# Patient Record
Sex: FEMALE | URBAN - METROPOLITAN AREA
[De-identification: names, ages, dates, MRNs, and addresses within clinical notes are randomized per-mention and may not be internally consistent; named-entity substitution may affect disease eponyms.]

---
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
## 2024-08-21 RX ORDER — CYCLOSPORINE 0 G/ML
1 SOLUTION/ DROPS OPHTHALMIC; TOPICAL 2 TIMES DAILY
COMMUNITY

## 2024-09-05 ENCOUNTER — HOSPITAL ENCOUNTER (OUTPATIENT)
Facility: HOSPITAL | Age: 24
Setting detail: HOSPITAL OUTPATIENT SURGERY
Discharge: HOME | End: 2024-09-05
Attending: INTERNAL MEDICINE | Admitting: INTERNAL MEDICINE
Payer: COMMERCIAL

## 2024-09-05 ENCOUNTER — ANESTHESIA EVENT (OUTPATIENT)
Dept: ENDOSCOPY | Facility: HOSPITAL | Age: 24
End: 2024-09-05
Payer: COMMERCIAL

## 2024-09-05 ENCOUNTER — ANESTHESIA (OUTPATIENT)
Dept: ENDOSCOPY | Facility: HOSPITAL | Age: 24
End: 2024-09-05
Payer: COMMERCIAL

## 2024-09-05 VITALS
WEIGHT: 187 LBS | OXYGEN SATURATION: 94 % | BODY MASS INDEX: 35.3 KG/M2 | RESPIRATION RATE: 16 BRPM | TEMPERATURE: 98 F | HEART RATE: 68 BPM | HEIGHT: 61 IN | DIASTOLIC BLOOD PRESSURE: 51 MMHG | SYSTOLIC BLOOD PRESSURE: 87 MMHG

## 2024-09-05 PROCEDURE — 88305 TISSUE EXAM BY PATHOLOGIST: CPT | Performed by: INTERNAL MEDICINE

## 2024-09-05 PROCEDURE — 0DB68ZX EXCISION OF STOMACH, VIA NATURAL OR ARTIFICIAL OPENING ENDOSCOPIC, DIAGNOSTIC: ICD-10-PCS | Performed by: INTERNAL MEDICINE

## 2024-09-05 PROCEDURE — 0DBE8ZX EXCISION OF LARGE INTESTINE, VIA NATURAL OR ARTIFICIAL OPENING ENDOSCOPIC, DIAGNOSTIC: ICD-10-PCS | Performed by: INTERNAL MEDICINE

## 2024-09-05 PROCEDURE — 0DB98ZX EXCISION OF DUODENUM, VIA NATURAL OR ARTIFICIAL OPENING ENDOSCOPIC, DIAGNOSTIC: ICD-10-PCS | Performed by: INTERNAL MEDICINE

## 2024-09-05 RX ORDER — SODIUM CHLORIDE, SODIUM LACTATE, POTASSIUM CHLORIDE, CALCIUM CHLORIDE 600; 310; 30; 20 MG/100ML; MG/100ML; MG/100ML; MG/100ML
INJECTION, SOLUTION INTRAVENOUS CONTINUOUS
Status: DISCONTINUED | OUTPATIENT
Start: 2024-09-05 | End: 2024-09-05

## 2024-09-05 RX ORDER — LIDOCAINE HYDROCHLORIDE 10 MG/ML
INJECTION, SOLUTION EPIDURAL; INFILTRATION; INTRACAUDAL; PERINEURAL AS NEEDED
Status: DISCONTINUED | OUTPATIENT
Start: 2024-09-05 | End: 2024-09-05 | Stop reason: SURG

## 2024-09-05 RX ORDER — NALOXONE HYDROCHLORIDE 0.4 MG/ML
0.08 INJECTION, SOLUTION INTRAMUSCULAR; INTRAVENOUS; SUBCUTANEOUS ONCE AS NEEDED
Status: DISCONTINUED | OUTPATIENT
Start: 2024-09-05 | End: 2024-09-05

## 2024-09-05 RX ADMIN — LIDOCAINE HYDROCHLORIDE 50 MG: 10 INJECTION, SOLUTION EPIDURAL; INFILTRATION; INTRACAUDAL; PERINEURAL at 09:00:00

## 2024-09-05 NOTE — H&P
[facility]   part of [facility]    Mary Glasgow Patient Status:  Hospital Outpatient Surgery    2000 MRN [id number]   Location [facility] ENDOSCOPY [facility]IN [facility] Attending Morgan Martinez DO   Hosp Day # 0 PCP Juan Oliveira MD     Active Problems:    * No active hospital problems. *      Mary Glasgow is a 24 year old female.    Allergies: No Known Allergies    Past Medical History:    Hx of motion sickness    In the car for long trips    Obesity    Visual impairment    Glasses       Height 5' 1\" (1.549 m), weight 187 lb (84.8 kg), last menstrual period 2024.      HPI    No complaints   Review of Systems  All negative     Physical Exam    GENERAL APPEARANCE: Appears healthy.  Alert; in no acute distress.  Pleasant.  HEAD: Atraumatic  EARS: R: not visualized secondary to cerumen L: not visualized secondary to cerumen  NOSE: Nares normal. Septum midline. Mucosa normal. No drainage or sinus tenderness.  THROAT: normal  NECK: no adenopathy    Morgan Martinez DO  2024

## 2024-09-05 NOTE — OPERATIVE REPORT
EGD Operative Report    Mary Glasgow Patient Status:  Hospital Outpatient Surgery    2000 MRN [id number]   Location [facility] ENDOSCOPY PAIN [facility] Attending Morgan Martinez,    Hosp Day #   0 PCP Juan Oliveira MD       Pre-op Diagnosis: generalized abdominal pain,rectal bleeding     Post-Op Diagnosis:    ESOPHAGUS:  Normal esophagus    STOMACH:  Normal stomach. Biopsied for H. Pylori infection    DUODENUM:  Normal duodenum. Biopsied for celiac disease     Procedure Performed: EGD     Informed Consent: Informed consent for both the procedure and sedation were obtained from the patient. The potentially life-threatening complications of sedation, bleeding,  Perforation, transfusion or repeat endoscopy were reviewed along with the possible need for hospitalization, surgical management, transfusion or repeat endoscopy should one of these complications arise. The patient understands and is agreeable to proceed.  Sedation Type: MAC-Patient received sedation with monitored anesthesia provided by an anesthesiologist  Moderate Sedation Time: None.  Deep sedation provided by anesthesia.  Procedure Description: The patient was placed in the left lateral decubitus position.  A bite block was placed in the patient’s mouth.  The endoscope was inserted through the mouth and advanced under direct visualization to the 3rd portion of the duodenum and was then withdrawn to examine the duodenal bulb and gastric antrum.  The endoscope was then retroflexed to examine the angulus, GE junction, cardia, body and fundus and then withdrawn to examine the esophagus. The endoscope was then removed from the patient. The patient tolerated the procedure well with no immediate complications and was transferred to the recovery area in stable condition.  Findings:    ESOPHAGUS:   Normal esophagus    STOMACH:  Normal stomach. Biopsied for H. Pylori infection    DUODENUM:  Normal duodenum. Biopsied for celiac disease   Recommendations:   Await pathology results   Discharge:  The patient was given an after visit summary detailing the procedure, findings, recommendations and follow up plans.  Morgan Martinez DO  9/5/2024  9:08 AM

## 2024-09-05 NOTE — ANESTHESIA POSTPROCEDURE EVALUATION
[facility]    Mary Glasgow Patient Status:  Hospital Outpatient Surgery   Age/Gender 24 year old female MRN [id number]   Location [facility] ENDOSCOPY PAIN C[facility] Attending Morgan Martinez DO   Hosp Day # 0 PCP Juan Oliveira MD       Anesthesia Post-op Note    ESOPHAGOGASTRODUODENOSCOPY (EGD) with biopsies , COLONOSCOPY with biopsies    Procedure Summary       Date: 09/05/24 Room / Location:  ENDOSCOPY 03 / [facility] END[facility]COPY    Anesthesia Start: 0859 Anesthesia Stop:     Procedures:       ESOPHAGOGASTRODUODENOSCOPY (EGD) with biopsies , COLONOSCOPY with biopsies      COLONOSCOPY Diagnosis: (egd: normal  colon: hemorrhoids)    Surgeons: Morgan Martinez DO Anesthesiologist: Cesar Higgins MD    Anesthesia Type: MAC ASA Status: 2            Anesthesia Type: MAC    Vitals Value Taken Time   BP 79/43 09/05/24 0925   Temp  09/05/24 0925   Pulse 80 09/05/24 0924   Resp 18 09/05/24 0922   SpO2 99 % 09/05/24 0924   Vitals shown include unfiled device data.    Patient Location: Endoscopy    Anesthesia Type: MAC    Airway Patency: patent    Postop Pain Control: adequate    Mental Status: mildly sedated but able to meaningfully participate in the post-anesthesia evaluation    Nausea/Vomiting: none    Cardiopulmonary/Hydration status: stable euvolemic    Complications: no apparent anesthesia related complications    Postop vital signs: stable    Dental Exam: Unchanged from Preop    Patient to be discharged home when criteria met.

## 2024-09-05 NOTE — OPERATIVE REPORT
Colonoscopy Operative Report    Mary Glasgow Patient Status:  Hospital Outpatient Surgery    2000 MRN [id number]   [facility] ENDOSCOPY [facility]IN C[facility] Attending Morgan Martinez,    Hosp Day #   0 PCP Juan Oliveira MD     Pre-Operative Diagnosis: generalized abdominal pain,rectal bleeding    Post-Operative Diagnosis:  Normal terminal ileum  Normal colon. Random colon biopsies taken from entire colon   Internal hemorrhoids     Procedure Performed: COLONOSCOPY     Informed Consent: Informed consent for both the procedure and sedation were obtained from the patient. The potentially life-threatening complications of sedation, bleeding,  perforation, transfusion or repeat endoscopy  were reviewed along with the possible need for hospitalization, surgical management, transfusion or repeat endoscopy should one of these complications arise. The patient understands and is agreeable to proceed.  Sedation Type: MAC  A trained sedation nurse was present to assist in monitoring the patient during the entire length of moderate sedation time.    Cecum Withdrawal Time:  6 mins  Date of previous colonoscopy: None     Procedure Description: The patient was placed in the left lateral decubitus position.  After careful digital rectal examination, the Adult colonoscope was inserted into the rectum and advanced to the level of the cecum under direct visualization. The cecum was identified by landmarks, including the appendiceal orifice and ileoceccal valve. Careful examination of the entire colon was performed during withdrawal of the endoscope. The scope was withdrawn to the rectum and retroflexion was performed.  The patient tolerated the procedure well with no immediate complications. The patient was transferred to the recovery area in stable condition.  Quality of Preparation: Adequate  Aronchick Bowel Prep Scale:  1- excellent   Findings:   Normal terminal  ileum  Normal colon. Random colon biopsies taken from entire colon   Internal hemorrhoids     Recommendations:   Await pathology results   Next colonoscopy at age 44   Discharge:  The patient was given an after visit summary detailing the procedure, findings, recommendations and follow up plans.     Morgan Martinez DO  9/5/2024  9:22 AM

## 2024-09-05 NOTE — ANESTHESIA PREPROCEDURE EVALUATION
PRE-OP EVALUATION    Patient Name: Mary Glasgow    Admit Diagnosis: generalized abdominal pain,rectal bleeding    Pre-op Diagnosis: generalized abdominal pain,rectal bleeding    ESOPHAGOGASTRODUODENOSCOPY (EGD), COLONOSCOPY    Anesthesia Procedure: ESOPHAGOGASTRODUODENOSCOPY (EGD), COLONOSCOPY  COLONOSCOPY    Surgeons and Role:     * Morgan Martinez, DO - Primary    Pre-op vitals reviewed.        Body mass index is 35.33 kg/m².    Current medications reviewed.  Hospital Medications:   lactated ringers infusion   Intravenous Continuous       Outpatient Medications:     Medications Prior to Admission   Medication Sig Dispense Refill Last Dose    cycloSPORINE, PF, (CEQUA) 0.09 % Ophthalmic Solution Apply 1 drop to eye in the morning and 1 drop before bedtime. Left eye.   9/4/2024    DICYCLOMINE HCL OR Take 50 mg by mouth every other day.       White Petrolatum-Mineral Oil (GENTEAL TEARS NIGHT-TIME) Ophthalmic Ointment At bedtime to both eyes       Multiple Vitamin Oral Tab Take 1 tablet by mouth.   9/4/2024    OMEGA-3 FATTY ACIDS OR Take by mouth.   9/4/2024       Allergies: Patient has no known allergies.      Anesthesia Evaluation    Patient summary reviewed.    Anesthetic Complications           GI/Hepatic/Renal    Negative GI/hepatic/renal ROS.                             Cardiovascular                (+) obesity     (+) hyperlipidemia                                  Endo/Other    Negative endo/other ROS.                              Pulmonary    Negative pulmonary ROS.                       Neuro/Psych    Negative neuro/psych ROS.                                  Past Surgical History:   Procedure Laterality Date    Eye surgery       Social History     Socioeconomic History    Marital status: Single   Tobacco Use    Smoking status: Never    Smokeless tobacco: Never   Vaping Use    Vaping status: Never Used   Substance and Sexual Activity    Alcohol use: No     Alcohol/week: 0.0 standard drinks of alcohol     Drug use: No    Sexual activity: Never     History   Drug Use No     Available pre-op labs reviewed.               Airway      Mallampati: II  Mouth opening: >3 FB  TM distance: > 6 cm  Neck ROM: full Cardiovascular    Cardiovascular exam normal.  Rhythm: regular  Rate: normal     Dental    Dentition appears grossly intact         Pulmonary    Pulmonary exam normal.  Breath sounds clear to auscultation bilaterally.               Other findings              ASA: 2   Plan: MAC  NPO status verified and patient meets guidelines.    Post-procedure pain management plan discussed with surgeon and patient.      Plan/risks discussed with: patient                Present on Admission:  **None**

## (undated) DEVICE — 1200CC GUARDIAN II: Brand: GUARDIAN

## (undated) DEVICE — KIT MFLD FOR SPEC COLL

## (undated) DEVICE — BITEBLOCK ENDOSCP 60FR MAXI STRP

## (undated) DEVICE — KIT CUSTOM ENDOPROCEDURE STERIS

## (undated) DEVICE — 10FT COMBINED O2 DELIVERY/CO2 MONITORING. FILTER WITH MICROSTREAM TYPE LUER: Brand: DUAL ADULT NASAL CANNULA

## (undated) DEVICE — GIJAW SINGLE-USE BIOPSY FORCEPS WITH NEEDLE: Brand: GIJAW

## (undated) DEVICE — 3M™ RED DOT™ MONITORING ELECTRODE WITH FOAM TAPE AND STICKY GEL 2570-3, 3/BAG, 200/CASE, 54/PLT: Brand: RED DOT™

## (undated) DEVICE — VALVE AIR/H20 DEFENDO SCT BX